# Patient Record
Sex: MALE | URBAN - METROPOLITAN AREA
[De-identification: names, ages, dates, MRNs, and addresses within clinical notes are randomized per-mention and may not be internally consistent; named-entity substitution may affect disease eponyms.]

---

## 2020-10-07 ENCOUNTER — OFFICE VISIT (OUTPATIENT)
Dept: URBAN - METROPOLITAN AREA CLINIC 41 | Facility: CLINIC | Age: 65
End: 2020-10-07
Payer: MEDICARE

## 2020-10-07 DIAGNOSIS — T85.22XA DISPLACEMENT OF INTRAOCULAR LENS, INITIAL ENCOUNTER: ICD-10-CM

## 2020-10-07 DIAGNOSIS — H40.9 GLAUCOMA: ICD-10-CM

## 2020-10-07 DIAGNOSIS — H35.373 PUCKERING OF MACULA, BILATERAL: Primary | ICD-10-CM

## 2020-10-07 PROCEDURE — 67515 INJECT/TREAT EYE SOCKET: CPT | Performed by: OPHTHALMOLOGY

## 2020-10-07 PROCEDURE — 92134 CPTRZ OPH DX IMG PST SGM RTA: CPT | Performed by: OPHTHALMOLOGY

## 2020-10-07 PROCEDURE — 92014 COMPRE OPH EXAM EST PT 1/>: CPT | Performed by: OPHTHALMOLOGY

## 2020-10-07 ASSESSMENT — INTRAOCULAR PRESSURE
OD: 5
OS: 9

## 2020-10-07 NOTE — IMPRESSION/PLAN
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
- s/p surgery Plan: Looks stable off treatment; observe

## 2020-10-07 NOTE — IMPRESSION/PLAN
Impression: Glaucoma: H40.9. Right. Status: Symptomatic. Plan: Recent IOPs in 46s after wound dehiscence repair with Dr. Bill Emerson. Fortunately, this is stable today after a laser procedure with Dr. Odalis Kauffman. Recent exam with Dr. Deniz Yoon demonstrated an IOP of 6. Continue follow up with Dr. Allen Saenz.

## 2020-10-07 NOTE — IMPRESSION/PLAN
Impression: PDR w DME OU Plan: OD: Exam and OCT demonstrate significant DME. Recommend PST-K OD. R/B/A discussed and patient elects to proceed. Subtenon K injected around OD without complication. OS: Exam and OCT demonstrate PDR with minimal DME; observe. 

RTC 1 mo with OCT OU, poss Avastin OU psych eval

## 2020-10-07 NOTE — IMPRESSION/PLAN
Impression: Puckering of macula, bilateral  H35.373. 
-s/p PPV, MP OD (10/17/19 PUD) -s/p PPV, poss EL, Avastin OS x PDR/VH/ERM (10/29/20 SI) Plan: OD: s/p PK/IOL exchange; complicated by [id number] IOL falling posteriorly. Exam today demonstrates no RD and dislocated IOL. Will continue to follow for now as he is currently working to control his IOP. Will likely require PPV for IOL explant. Discussed that as long as retina is attached, can continue to observe dislocated IOL as this is bothering him less. His PK is improving. Recommend PST- K OD

OS:  Doing well. Exam shows retina is doing well in both eyes. VH, OS is resolved. OCT shows improved foveal contour OS. IOP is stable, OU. RDW. Patient is following with Dr. Joanie Veloz for epi scar/defect OS and cataract OS. The trace DME is stable off tx.  

RTC: 3 mo with OCT OU

## 2021-01-08 ENCOUNTER — OFFICE VISIT (OUTPATIENT)
Dept: URBAN - METROPOLITAN AREA CLINIC 41 | Facility: CLINIC | Age: 66
End: 2021-01-08
Payer: MEDICARE

## 2021-01-08 DIAGNOSIS — H25.12 AGE-RELATED NUCLEAR CATARACT, LEFT EYE: ICD-10-CM

## 2021-01-08 DIAGNOSIS — E11.3532 TYPE 2 DIAB W PROLIF DIAB RTNOP W TRCTN DTCH N-MCLA, L EYE: ICD-10-CM

## 2021-01-08 PROCEDURE — 67515 INJECT/TREAT EYE SOCKET: CPT | Performed by: OPHTHALMOLOGY

## 2021-01-08 PROCEDURE — 92134 CPTRZ OPH DX IMG PST SGM RTA: CPT | Performed by: OPHTHALMOLOGY

## 2021-01-08 PROCEDURE — 99214 OFFICE O/P EST MOD 30 MIN: CPT | Performed by: OPHTHALMOLOGY

## 2021-01-08 ASSESSMENT — INTRAOCULAR PRESSURE
OD: 9
OS: 12

## 2021-01-08 NOTE — IMPRESSION/PLAN
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
- s/p surgery Plan: Looks stable off treatment; observe

## 2021-01-08 NOTE — IMPRESSION/PLAN
Impression: Puckering of macula, bilateral  H35.373. 
-s/p PPV, MP OD (10/17/19 PUD) -s/p PPV, poss EL, Avastin OS x PDR/VH/ERM (10/29/20 SI) Plan: OD: s/p PK/IOL exchange; complicated by [id number] IOL falling posteriorly. Exam today demonstrates no RD and dislocated IOL. Will continue to follow for now as he is currently working to control his IOP. Will likely require PPV for IOL explant. Discussed that as long as retina is attached, can continue to observe dislocated IOL as this is bothering him less. His PK is improving. Recommend PST- K OD

OS:  Doing well. Exam shows retina is doing well in both eyes. VH, OS is resolved. OCT shows improved foveal contour OS. IOP is stable, OU. RDW. Patient is following with Dr. David Done for epi scar/defect OS/ HSV keratitis and cataract OS. The trace DME is slightly worse off tx and will follow closely.

## 2021-01-08 NOTE — IMPRESSION/PLAN
Impression: PDR w DME OU Plan: OD: Exam and OCT demonstrate improved but persistent DME. Recommend PST-K OD. R/B/A discussed and patient elects to proceed. Subtenon K injected around OD without complication. OS: Exam and OCT demonstrate PDR with minimal DME that is slightly worse; observe. 

RTC 1 mo with OCT OU, poss Avastin OU

## 2021-01-08 NOTE — IMPRESSION/PLAN
Impression: Herpesviral keratitis: B00.52. Plan: Patient had recurrent episode of HSV keratitis OS and is currently on topical treatment with Dr. Cathy Paredes. Discussed that prophylactic Acyclovir vs Valtrex may be necessary. Patient to discuss this with Dr. Cathy Paredes.

## 2021-02-10 ENCOUNTER — OFFICE VISIT (OUTPATIENT)
Dept: URBAN - METROPOLITAN AREA CLINIC 41 | Facility: CLINIC | Age: 66
End: 2021-02-10
Payer: MEDICARE

## 2021-02-10 PROCEDURE — 92134 CPTRZ OPH DX IMG PST SGM RTA: CPT | Performed by: OPHTHALMOLOGY

## 2021-02-10 PROCEDURE — 92012 INTRM OPH EXAM EST PATIENT: CPT | Performed by: OPHTHALMOLOGY

## 2021-02-10 RX ORDER — BRIMONIDINE TARTRATE 2 MG/ML
0.2 % SOLUTION/ DROPS OPHTHALMIC
Qty: 5 | Refills: 3 | Status: INACTIVE
Start: 2021-02-10 | End: 2021-05-10

## 2021-02-10 ASSESSMENT — INTRAOCULAR PRESSURE
OS: 14
OD: 24

## 2021-02-10 NOTE — IMPRESSION/PLAN
Impression: Herpesviral keratitis: B00.52. Plan: Patient had recurrent episode of HSV keratitis OS and is currently on topical treatment with Dr. Ashley Ibarra. Discussed that prophylactic Acyclovir vs Valtrex may be necessary. Patient to discuss this with Dr. Ashley Ibarra.

## 2021-02-10 NOTE — IMPRESSION/PLAN
Impression: PDR w DME OU Plan: OD: Exam and OCT demonstrate improved DME after PST- K.  IOP is borderline. Recommend observation OD today. Will start Brimonidine OD BID. OS: Exam and OCT demonstrate PDR with minimal DME that is slightly better; observe for now. 

RTC 1 mo with OCT OU, poss PST-K OU

## 2021-02-10 NOTE — IMPRESSION/PLAN
Impression: Age-related nuclear cataract, left eye: H25.12. Plan: Followed by Dr. Tobi Carcamo.   Observe for now given HSV keratitis

## 2021-02-10 NOTE — IMPRESSION/PLAN
Impression: Puckering of macula, bilateral  H35.373. 
-s/p PPV, MP OD (10/17/19 PUD) -s/p PPV, poss EL, Avastin OS x PDR/VH/ERM (10/29/20 SI) Plan: OD: s/p PK/IOL exchange; complicated by [id number] IOL falling posteriorly. Exam today demonstrates no RD and dislocated IOL. May require PPV for IOL explant. Discussed that as long as retina is attached, can continue to observe dislocated IOL as this is bothering him less. The view through the PK is improving. OS:  Doing well. Exam shows retina is doing well in both eyes. Post op VH OS is resolved. OCT shows improved foveal contour OS. RDW. Patient is following with Dr. Matteo Taylor for epi scar/defect OS/ HSV keratitis and cataract OS. The trace DME is improved today off tx and will follow closely.

## 2021-03-24 ENCOUNTER — OFFICE VISIT (OUTPATIENT)
Dept: URBAN - METROPOLITAN AREA CLINIC 41 | Facility: CLINIC | Age: 66
End: 2021-03-24
Payer: MEDICARE

## 2021-03-24 DIAGNOSIS — E11.3513 TYPE 2 DIABETES MELLITUS W/ PROLIFERATIVE DIABETIC RETINOPATHY W/ MACULAR EDEMA OF BILATERAL EYES: ICD-10-CM

## 2021-03-24 DIAGNOSIS — B00.52 HERPESVIRAL KERATITIS: ICD-10-CM

## 2021-03-24 PROCEDURE — 92134 CPTRZ OPH DX IMG PST SGM RTA: CPT | Performed by: OPHTHALMOLOGY

## 2021-03-24 PROCEDURE — 67515 INJECT/TREAT EYE SOCKET: CPT | Performed by: OPHTHALMOLOGY

## 2021-03-24 PROCEDURE — 92012 INTRM OPH EXAM EST PATIENT: CPT | Performed by: OPHTHALMOLOGY

## 2021-03-24 ASSESSMENT — INTRAOCULAR PRESSURE
OS: 11
OD: 9

## 2021-03-24 NOTE — IMPRESSION/PLAN
Impression: PDR w DME OU
-OD: s/p PST- K (last 1/8/2021) Plan: OD: Exam and OCT demonstrate stable DME after PST- K 1/8/2021. IOP is good. Recommend repeat PST-K OD today. Will re-start Brimonidine OD BID. PST-K injection performed today OD without complication OS: Exam and OCT demonstrate PDR with minimal DME that is slightly better; observe for now. 

RTC 4-6 wks with OCT OU,

## 2021-03-24 NOTE — IMPRESSION/PLAN
Impression: Age-related nuclear cataract, left eye: H25.12. Plan: Followed by Dr. Jayden Kirby.   Observe for now given HSV keratitis

## 2021-03-24 NOTE — IMPRESSION/PLAN
Impression: Herpesviral keratitis: B00.52. Plan: Patient had recurrent episode of HSV keratitis OS and is currently on topical treatment with Dr. Nisha Roach. Discussed that prophylactic Acyclovir vs Valtrex may be necessary. Patient to discuss this with Dr. Nisha Roach.

## 2021-03-24 NOTE — IMPRESSION/PLAN
Impression: Puckering of macula, bilateral  H35.373. 
-s/p PPV, MP OD (10/17/19 PUD) -s/p PPV, poss EL, Avastin OS x PDR/VH/ERM (10/29/20 SI) Plan: OD: s/p PK/IOL exchange; complicated by [id number] IOL falling posteriorly. Exam today demonstrates no RD and dislocated IOL. May require PPV for IOL explant. Discussed that as long as retina is attached, can continue to observe dislocated IOL as this is bothering him less and less. The view through the PK is improving. OS:  Doing well. Exam shows retina is doing well in both eyes. Post op VH OS is resolved. OCT shows improved foveal contour OS. RDW. Patient is following with Dr. Jaclyn Paz for epi scar/defect OS/ HSV keratitis and cataract OS. The trace DME is improved today off tx and will follow closely.

## 2021-07-08 ENCOUNTER — OFFICE VISIT (OUTPATIENT)
Dept: URBAN - METROPOLITAN AREA CLINIC 27 | Facility: CLINIC | Age: 66
End: 2021-07-08
Payer: MEDICARE

## 2021-07-08 PROCEDURE — 92014 COMPRE OPH EXAM EST PT 1/>: CPT | Performed by: OPHTHALMOLOGY

## 2021-07-08 PROCEDURE — 92134 CPTRZ OPH DX IMG PST SGM RTA: CPT | Performed by: OPHTHALMOLOGY

## 2021-07-08 ASSESSMENT — INTRAOCULAR PRESSURE
OS: 11
OD: 8

## 2021-07-08 NOTE — IMPRESSION/PLAN
Impression: Puckering of macula, bilateral  H35.373. 
-s/p PPV, MP OD (10/17/19 PUD) -s/p PPV, poss EL, Avastin OS x PDR/VH/ERM (10/29/20 SI) Plan: OD: s/p PK/IOL exchange; complicated by [id number] IOL falling posteriorly. Exam today demonstrates no RD and dislocated IOL. May require PPV for IOL explant. Discussed that as long as retina is attached, can continue to observe dislocated IOL as this is bothering him less and less. The view through the PK is excellent and level of atrophy in the macula OD is likely to limit the vision. OS:  Doing well. Exam shows retina is doing well in both eyes. Post op VH OS is resolved. OCT shows improved foveal contour OS. RDW. Patient is following with Dr. Jaclyn Paz for epi scar/defect OS/ HSV keratitis and cataract OS. The trace DME is improved today off tx and will follow closely.

## 2021-07-08 NOTE — IMPRESSION/PLAN
Impression: Age-related nuclear cataract, left eye: H25.12. Plan: Followed by Dr. Nisha Roach. Likely VS.  Patient cleared from retina standpoint to proceed with cataract surgery. Recommend follow up with Dr. Nisha Roach after he is out of rehab after recent fall.

## 2021-07-08 NOTE — IMPRESSION/PLAN
Impression: Herpesviral keratitis: B00.52. Plan: Patient had recurrent episode of HSV keratitis OS and is currently off treatment. Discussed that prophylactic Acyclovir vs Valtrex may be necessary. Patient to discuss this with Dr. Jaclyn Paz.

## 2021-07-08 NOTE — IMPRESSION/PLAN
Impression: PDR w DME OU
-OD: s/p PST- K (last 1/8/2021) Plan: OD: Exam and OCT demonstrate minimal DME after PST- K 1/8/2021. IOP is good. Recommend observation OD today. OS: Exam and OCT demonstrate PDR with minimal DME that is slightly better; observe for now. RTC 2-3 mo with OCT OU, poss PST K vs Avastin.

## 2021-09-17 ENCOUNTER — OFFICE VISIT (OUTPATIENT)
Dept: URBAN - METROPOLITAN AREA CLINIC 41 | Facility: CLINIC | Age: 66
End: 2021-09-17
Payer: MEDICARE

## 2021-09-17 PROCEDURE — 92012 INTRM OPH EXAM EST PATIENT: CPT | Performed by: OPHTHALMOLOGY

## 2021-09-17 PROCEDURE — 92134 CPTRZ OPH DX IMG PST SGM RTA: CPT | Performed by: OPHTHALMOLOGY

## 2021-09-17 ASSESSMENT — INTRAOCULAR PRESSURE
OS: 8
OD: 4

## 2021-09-17 NOTE — IMPRESSION/PLAN
Impression: Age-related nuclear cataract, left eye: H25.12. Plan: Followed by Dr. Doron Lazo. Likely VS.  Patient cleared from retina standpoint to proceed with cataract surgery. Recommend follow up with Dr. Doron Lazo after he is out of the hospital (had to delay recent appt for surgery 8/31/2021.

## 2021-09-17 NOTE — IMPRESSION/PLAN
Impression: Herpesviral keratitis: B00.52. Plan: Patient had recurrent episode of HSV keratitis OS and is currently off treatment. Discussed that prophylactic Acyclovir vs Valtrex may be necessary. Patient to discuss this with Dr. Alexandra Zavala.

## 2021-09-17 NOTE — IMPRESSION/PLAN
Impression: Puckering of macula, bilateral  H35.373. 
-s/p PPV, MP OD (10/17/19 PUD) -s/p PPV, poss EL, Avastin OS x PDR/VH/ERM (10/29/20 SI) Plan: OD: s/p PK/IOL exchange; complicated by [id number] IOL falling posteriorly. Exam today demonstrates no RD and dislocated IOL. May require PPV for IOL explant. Discussed that as long as retina is attached, can continue to observe dislocated IOL as this is bothering him less and less. The view through the PK is excellent and level of atrophy in the macula OD is likely to limit the vision. OS:  Doing well. Exam shows retina is doing well in both eyes. Post op VH OS is resolved. OCT shows improved foveal contour OS. RDW. Patient is following with Dr. Anant Gordon for epi scar/defect OS/ HSV keratitis and cataract OS. The trace DME is improved today off tx and will follow closely.

## 2021-09-17 NOTE — IMPRESSION/PLAN
Impression: PDR w DME OU
-OD: s/p PST- K last 03/24/2021 Plan: OD: Exam and OCT demonstrate minimal DME after PST- K 1/8/2021. IOP is good. Recommend observation OD today. OS: Exam and OCT demonstrate PDR with minimal DME that is slightly better; observe for now. RTC 3 mo with OCT OU, Optos colors OU, poss PST K vs Avastin.

## 2022-02-14 ENCOUNTER — OFFICE VISIT (OUTPATIENT)
Dept: URBAN - METROPOLITAN AREA CLINIC 41 | Facility: CLINIC | Age: 67
End: 2022-02-14
Payer: MEDICARE

## 2022-02-14 DIAGNOSIS — Z96.1 PRESENCE OF INTRAOCULAR LENS: ICD-10-CM

## 2022-02-14 PROCEDURE — 99214 OFFICE O/P EST MOD 30 MIN: CPT | Performed by: OPHTHALMOLOGY

## 2022-02-14 PROCEDURE — 92134 CPTRZ OPH DX IMG PST SGM RTA: CPT | Performed by: OPHTHALMOLOGY

## 2022-02-14 ASSESSMENT — INTRAOCULAR PRESSURE
OD: 14
OS: 11

## 2022-02-14 NOTE — IMPRESSION/PLAN
Impression: PDR w DME OU
-OD: s/p PST- K last 03/24/2021 Plan: OD: Exam and OCT demonstrate minimal DME after PST- K 1/8/2021. IOP is good. Recommend observation OD today. OS: Exam and OCT demonstrate PDR with minimal DME that is slightly better; observe for now. RTC 6 mo with OCT OU, Optos colors OU, poss PST K vs Avastin.

## 2022-02-14 NOTE — IMPRESSION/PLAN
Impression: Puckering of macula, bilateral  H35.373. 
-s/p PPV, MP OD (10/17/19 PUD) -s/p PPV, poss EL, Avastin OS x PDR/VH/ERM (10/29/20 SI) Plan: OD: s/p PK/IOL exchange; complicated by [id number] IOL falling posteriorly. Exam today demonstrates no RD and dislocated IOL. May require PPV for IOL explant. Discussed that as long as retina is attached, can continue to observe dislocated IOL as this is bothering him less and less. The view through the PK is excellent and level of atrophy in the macula OD is likely to limit the vision. OS:  Doing well. Exam shows retina is doing well in both eyes. Post op VH OS is resolved. OCT shows improved foveal contour OS. RDW. Patient is following with Dr. Nisha Roach for epi scar/defect OS/ HSV keratitis. The DME is improved today off tx. Patient cleared to proceed with new Mrx when corneal defect is stabilized.

## 2022-02-14 NOTE — IMPRESSION/PLAN
Impression: Presence of intraocular lens: Z96.1. Plan: Looks great. Patient cleared from retina standpoint to proceed with new Mrx when indicated.

## 2022-02-14 NOTE — IMPRESSION/PLAN
Impression: Herpesviral keratitis: B00.52. Plan: Patient had recurrent episode of HSV keratitis OS and is currently off treatment. Discussed that prophylactic Acyclovir vs Valtrex may be necessary. Patient to discuss this with Dr. Serafin Armenta.

## 2022-08-15 ENCOUNTER — OFFICE VISIT (OUTPATIENT)
Dept: URBAN - METROPOLITAN AREA CLINIC 41 | Facility: CLINIC | Age: 67
End: 2022-08-15
Payer: MEDICARE

## 2022-08-15 DIAGNOSIS — E11.3513 TYPE 2 DIABETES MELLITUS W/ PROLIFERATIVE DIABETIC RETINOPATHY W/ MACULAR EDEMA OF BILATERAL EYES: Primary | ICD-10-CM

## 2022-08-15 DIAGNOSIS — Z96.1 PRESENCE OF INTRAOCULAR LENS: ICD-10-CM

## 2022-08-15 DIAGNOSIS — B00.52 HERPESVIRAL KERATITIS: ICD-10-CM

## 2022-08-15 DIAGNOSIS — H35.373 PUCKERING OF MACULA, BILATERAL: ICD-10-CM

## 2022-08-15 DIAGNOSIS — T85.22XA DISPLACEMENT OF INTRAOCULAR LENS, INITIAL ENCOUNTER: ICD-10-CM

## 2022-08-15 PROCEDURE — 92134 CPTRZ OPH DX IMG PST SGM RTA: CPT | Performed by: OPHTHALMOLOGY

## 2022-08-15 PROCEDURE — 99214 OFFICE O/P EST MOD 30 MIN: CPT | Performed by: OPHTHALMOLOGY

## 2022-08-15 ASSESSMENT — INTRAOCULAR PRESSURE
OS: 11
OD: 15

## 2022-08-15 NOTE — IMPRESSION/PLAN
Impression: Herpesviral keratitis: B00.52. Plan: Patient had another recurrent episode of HSV keratitis OS 2 mo that was tx and he is currently off treatment. Discussed that prophylactic Acyclovir vs Valtrex may be necessary. Patient to discuss this with Dr. Matteo Taylor.

## 2022-08-15 NOTE — IMPRESSION/PLAN
Impression: Puckering of macula, bilateral  H35.373. 
-s/p PPV, MP OD (10/17/19 PUD) -s/p PPV, poss EL, Avastin OS x PDR/VH/ERM (10/29/20 SI) Plan: OD: s/p PK/IOL exchange; complicated by [id number] IOL falling posteriorly. Exam today demonstrates no RD and dislocated IOL. May require PPV for IOL explant. Discussed that as long as retina is attached, can continue to observe dislocated IOL as this is bothering him less and less. The view through the PK is excellent and level of atrophy in the macula OD is likely to limit the vision. OS:  Doing well. Exam shows retina is doing well in both eyes. Post op VH OS is resolved. OCT shows improved foveal contour OS. RDW. Patient is following with Dr. Uzma Mott for epi scar/defect OS/ HSV keratitis. The DME is improved today off tx. Patient cleared to proceed with new Mrx when corneal defect is stabilized.

## 2022-12-16 ENCOUNTER — OFFICE VISIT (OUTPATIENT)
Dept: URBAN - METROPOLITAN AREA CLINIC 41 | Facility: CLINIC | Age: 67
End: 2022-12-16
Payer: MEDICARE

## 2022-12-16 DIAGNOSIS — E11.3513 TYPE 2 DIABETES MELLITUS W/ PROLIFERATIVE DIABETIC RETINOPATHY W/ MACULAR EDEMA OF BILATERAL EYES: ICD-10-CM

## 2022-12-16 DIAGNOSIS — B00.52 HERPESVIRAL KERATITIS: ICD-10-CM

## 2022-12-16 DIAGNOSIS — T85.22XA DISPLACEMENT OF INTRAOCULAR LENS, INITIAL ENCOUNTER: ICD-10-CM

## 2022-12-16 DIAGNOSIS — Z96.1 PRESENCE OF INTRAOCULAR LENS: Primary | ICD-10-CM

## 2022-12-16 DIAGNOSIS — H35.373 PUCKERING OF MACULA, BILATERAL: ICD-10-CM

## 2022-12-16 PROCEDURE — 99214 OFFICE O/P EST MOD 30 MIN: CPT | Performed by: OPHTHALMOLOGY

## 2022-12-16 PROCEDURE — 92134 CPTRZ OPH DX IMG PST SGM RTA: CPT | Performed by: OPHTHALMOLOGY

## 2022-12-16 ASSESSMENT — INTRAOCULAR PRESSURE: OS: 4

## 2022-12-16 NOTE — IMPRESSION/PLAN
Impression: Presence of intraocular lens: Z96.1. Plan: Looks great. PCO improved after YAG. Unfortunately, he notes vision is worse. Discussed this is related to worsening corneal scarring.

## 2022-12-16 NOTE — IMPRESSION/PLAN
Impression: Herpesviral keratitis: B00.52. Plan: Patient had another recurrent episode of HSV keratitis OS 2 mo that was tx and he is currently off treatment. Discussed that prophylactic Acyclovir vs Valtrex may be necessary. Patient to discuss this with Dr. Bahman Naranjo.

## 2022-12-16 NOTE — IMPRESSION/PLAN
Impression: PDR w DME OU
-OD: s/p PST- K last 03/24/2021 Plan: OD: Exam and OCT demonstrate minimal DME after PST- K 1/8/2021. IOP is low. Recommend observation OD today. OS: Exam and OCT demonstrate PDR with minimal DME that is slightly better; observe for now. RTC 6 mo with OCT OU, Optos colors OU, poss PST K vs Avastin.

## 2022-12-16 NOTE — IMPRESSION/PLAN
Impression: Puckering of macula, bilateral  H35.373. 
-s/p PPV, MP OD (10/17/19 PUD) -s/p PPV, poss EL, Avastin OS x PDR/VH/ERM (10/29/20 SI) Plan: OD: s/p PK/IOL exchange; complicated by [id number] IOL falling posteriorly. Exam today demonstrates no RD and dislocated IOL. May require PPV for IOL explant. Discussed that as long as retina is attached, can continue to observe dislocated IOL as this is bothering him less and less. The view through the PK is excellent; his longstanding atrophy in the macula OD is likely to limit the vision. OS:  Doing well. Exam shows retina is doing well in both eyes. OCT shows improved foveal contour OS. RDW. Patient is following with Dr. Alberto Jeffers for epi scar/defect OS/ HSV keratitis. The DME is improved today off tx. Patient cleared to proceed with new Mrx when corneal defect is stabilized.

## 2023-02-06 NOTE — IMPRESSION/PLAN
Impression: Presence of intraocular lens: Z96.1. Plan: Looks great. 1-2+ PCO OS. Rec eval for YAG laser with Dr. Afshin Baldwin. yes

## 2023-02-27 ENCOUNTER — OFFICE VISIT (OUTPATIENT)
Dept: URBAN - METROPOLITAN AREA CLINIC 41 | Facility: CLINIC | Age: 68
End: 2023-02-27
Payer: MEDICARE

## 2023-02-27 DIAGNOSIS — E11.3513 TYPE 2 DIABETES MELLITUS W/ PROLIFERATIVE DIABETIC RETINOPATHY W/ MACULAR EDEMA OF BILATERAL EYES: ICD-10-CM

## 2023-02-27 DIAGNOSIS — H35.373 PUCKERING OF MACULA, BILATERAL: Primary | ICD-10-CM

## 2023-02-27 DIAGNOSIS — Z96.1 PRESENCE OF INTRAOCULAR LENS: ICD-10-CM

## 2023-02-27 DIAGNOSIS — B00.52 HERPESVIRAL KERATITIS: ICD-10-CM

## 2023-02-27 DIAGNOSIS — T85.22XA DISPLACEMENT OF INTRAOCULAR LENS, INITIAL ENCOUNTER: ICD-10-CM

## 2023-02-27 PROCEDURE — 92134 CPTRZ OPH DX IMG PST SGM RTA: CPT | Performed by: OPHTHALMOLOGY

## 2023-02-27 PROCEDURE — 99214 OFFICE O/P EST MOD 30 MIN: CPT | Performed by: OPHTHALMOLOGY

## 2023-02-27 ASSESSMENT — INTRAOCULAR PRESSURE
OS: 9
OD: 5

## 2023-02-27 NOTE — IMPRESSION/PLAN
Impression: Puckering of macula, bilateral  H35.373. 
-s/p PPV, MP OD (10/17/19 PUD) -s/p PPV, poss EL, Avastin OS x PDR/VH/ERM (10/29/20 SI) Plan: OD: s/p PK/IOL exchange; complicated by [id number] IOL falling posteriorly. Exam today demonstrates no RD and dislocated IOL. May require PPV for IOL explant. Discussed that as long as retina is attached, can continue to observe dislocated IOL as this is bothering him less and less. The view through the PK is excellent; his longstanding atrophy in the macula OD is likely to limit the vision. OS:  Doing well. Exam shows retina is doing well in both eyes. OCT shows improved foveal contour OS. RDW. Patient is following with Dr. José Miguel Gavin and Dr. Oscar Mao for epi scar/defect OS/ HSV keratitis. The DME is improved today off tx. Patient cleared to proceed with new Mrx when corneal defect is stabilized. Outside notes from Dr. 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 In past, he was 20/50 OS. Currently, retina appears to be stable compared to when his vision was at that level before corneal scarring.

## 2023-02-27 NOTE — IMPRESSION/PLAN
Impression: Herpesviral keratitis: B00.52. Plan: Patient had another recurrent episode of HSV keratitis OS 2 mo that was tx and he is currently off treatment. Discussed that prophylactic Acyclovir vs Valtrex may be necessary. Patient to discuss this with Dr. Dave Tabares.

## 2023-06-16 ENCOUNTER — OFFICE VISIT (OUTPATIENT)
Dept: URBAN - METROPOLITAN AREA CLINIC 41 | Facility: CLINIC | Age: 68
End: 2023-06-16
Payer: MEDICARE

## 2023-06-16 DIAGNOSIS — H35.373 PUCKERING OF MACULA, BILATERAL: ICD-10-CM

## 2023-06-16 DIAGNOSIS — E11.3513 TYPE 2 DIABETES MELLITUS W/ PROLIFERATIVE DIABETIC RETINOPATHY W/ MACULAR EDEMA OF BILATERAL EYES: Primary | ICD-10-CM

## 2023-06-16 DIAGNOSIS — B00.52 HERPESVIRAL KERATITIS: ICD-10-CM

## 2023-06-16 DIAGNOSIS — T85.22XA DISPLACEMENT OF INTRAOCULAR LENS, INITIAL ENCOUNTER: ICD-10-CM

## 2023-06-16 DIAGNOSIS — Z96.1 PRESENCE OF INTRAOCULAR LENS: ICD-10-CM

## 2023-06-16 PROCEDURE — 92014 COMPRE OPH EXAM EST PT 1/>: CPT | Performed by: OPHTHALMOLOGY

## 2023-06-16 PROCEDURE — 92134 CPTRZ OPH DX IMG PST SGM RTA: CPT | Performed by: OPHTHALMOLOGY

## 2023-06-16 ASSESSMENT — INTRAOCULAR PRESSURE
OS: 15
OD: 11

## 2023-06-16 NOTE — IMPRESSION/PLAN
Impression: Herpesviral keratitis: B00.52. Plan: Patient had another recurrent episode of HSV keratitis OS 2 mo that was tx and he is currently off treatment. Discussed that prophylactic Acyclovir vs Valtrex may be necessary. Patient to discuss this with Dr. Chares Gosselin.

## 2023-06-16 NOTE — IMPRESSION/PLAN
Impression: PDR w DME OU
-OD: s/p PST- K last 03/24/2021 Plan: OD: Exam and OCT demonstrate minimal DME after PST- K 1/8/2021. IOP is low. Recommend observation OD today. OS: Exam and OCT demonstrate PDR with minimal DME that is slightly better; observe for now. RTC 12 mo with OCT OU, Optos colors OU, poss PST K vs Avastin.

## 2023-06-16 NOTE — IMPRESSION/PLAN
Impression: Presence of intraocular lens: Z96.1. Plan: Looks great. PCO improved after YAG. Vision improved with improved corneal appearance.

## 2023-06-16 NOTE — IMPRESSION/PLAN
Impression: Puckering of macula, bilateral  H35.373. 
-s/p PPV, MP OD (10/17/19 PUD) -s/p PPV, poss EL, Avastin OS x PDR/VH/ERM (10/29/20 SI) Plan: OD: s/p PK/IOL exchange; complicated by [id number] IOL falling posteriorly. Exam today demonstrates no RD and dislocated IOL. May require PPV for IOL explant. Discussed that as long as retina is attached, can continue to observe dislocated IOL as this is bothering him less and less. The view through the PK is excellent; his longstanding atrophy in the macula OD is likely to limit the vision. OS:  Doing well. Exam shows retina is doing well in both eyes. OCT shows improved foveal contour OS. RDW. Patient is following with Dr. Reji Muhammad and Dr. Christopher Wilson for epi scar/defect OS/ HSV keratitis. The DME is improved today off tx. Patient cleared to proceed with new Mrx when corneal defect is stabilized. Outside notes from Dr. 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 In past, he was 20/50 OS. Currently, retina appears to be stable compared to when his vision was at that level before corneal scarring.